# Patient Record
Sex: FEMALE | Race: WHITE | Employment: OTHER | URBAN - METROPOLITAN AREA
[De-identification: names, ages, dates, MRNs, and addresses within clinical notes are randomized per-mention and may not be internally consistent; named-entity substitution may affect disease eponyms.]

---

## 2017-12-29 PROBLEM — M54.16 LUMBAR RADICULITIS: Status: ACTIVE | Noted: 2017-12-29

## 2017-12-29 PROBLEM — M51.36 DDD (DEGENERATIVE DISC DISEASE), LUMBAR: Status: ACTIVE | Noted: 2017-12-29

## 2017-12-29 PROBLEM — M51.369 DDD (DEGENERATIVE DISC DISEASE), LUMBAR: Status: ACTIVE | Noted: 2017-12-29

## 2017-12-29 PROBLEM — M47.816 LUMBAR SPONDYLOSIS: Status: ACTIVE | Noted: 2017-12-29

## 2017-12-29 PROBLEM — M48.062 SPINAL STENOSIS OF LUMBAR REGION WITH NEUROGENIC CLAUDICATION: Status: ACTIVE | Noted: 2017-12-29

## 2024-03-12 ENCOUNTER — OFFICE VISIT (OUTPATIENT)
Dept: OTOLARYNGOLOGY | Facility: CLINIC | Age: 84
End: 2024-03-12

## 2024-03-12 VITALS — BODY MASS INDEX: 21.83 KG/M2 | WEIGHT: 131 LBS | HEIGHT: 65 IN

## 2024-03-12 DIAGNOSIS — H93.13 BILATERAL TINNITUS: Primary | ICD-10-CM

## 2024-03-12 DIAGNOSIS — H61.23 BILATERAL IMPACTED CERUMEN: ICD-10-CM

## 2024-03-12 DIAGNOSIS — H74.01 TYMPANOSCLEROSIS OF RIGHT EAR: ICD-10-CM

## 2024-03-12 PROCEDURE — 3008F BODY MASS INDEX DOCD: CPT | Performed by: SPECIALIST

## 2024-03-12 PROCEDURE — 99202 OFFICE O/P NEW SF 15 MIN: CPT | Performed by: SPECIALIST

## 2024-03-12 PROCEDURE — 1159F MED LIST DOCD IN RCRD: CPT | Performed by: SPECIALIST

## 2024-03-12 PROCEDURE — 69210 REMOVE IMPACTED EAR WAX UNI: CPT | Performed by: SPECIALIST

## 2024-03-12 PROCEDURE — 1160F RVW MEDS BY RX/DR IN RCRD: CPT | Performed by: SPECIALIST

## 2024-03-12 RX ORDER — LOSARTAN POTASSIUM 50 MG/1
1 TABLET ORAL DAILY
COMMUNITY

## 2024-03-12 RX ORDER — METOPROLOL SUCCINATE 25 MG/1
1 TABLET, EXTENDED RELEASE ORAL DAILY
COMMUNITY

## 2024-03-12 NOTE — PROGRESS NOTES
Wilma Evans is a 84 year old female.   Chief Complaint   Patient presents with    Ear Wax     Ear cleaning    Ringing In Ear     Tinnitus in ears     HPI:   Patient here for bilateral tinnitus and cerumen occlusions.  Wears binaural hearing aids.    Current Outpatient Medications   Medication Sig Dispense Refill    losartan 50 MG Oral Tab Take 1 tablet (50 mg total) by mouth daily.      metoprolol succinate ER 25 MG Oral Tablet 24 Hr Take 1 tablet (25 mg total) by mouth daily.      DIOVAN -25 MG OR TABS 1 TABLET DAILY      LIPITOR 20 MG OR TABS 1 TABLET DAILY        Past Medical History:   Diagnosis Date    CANCER     HYPERTENSION     Unspecified essential hypertension       Social History:  Social History     Socioeconomic History    Marital status:    Tobacco Use    Smoking status: Former    Smokeless tobacco: Never    Tobacco comments:     quit 20 years ago    Vaping Use    Vaping Use: Never used   Substance and Sexual Activity    Alcohol use: Yes     Comment: glass of wine with dinner     Drug use: No        REVIEW OF SYSTEMS:   GENERAL HEALTH: feels well otherwise  GENERAL : denies fever, chills, sweats, weight loss, weight gain  SKIN: denies any unusual skin lesions or rashes  RESPIRATORY: denies shortness of breath with exertion  NEURO: denies headaches    EXAM:   Ht 5' 5\" (1.651 m)   Wt 131 lb (59.4 kg)   BMI 21.80 kg/m²   System Details   Skin Inspection - Normal.   Constitutional Overall appearance - Normal.   Head/Face Facial features - Normal. Eyebrows - Normal. Skull - Normal.   Eyes Conjunctiva - Right: Normal, Left: Normal. Pupil - Right: Normal, Left: Normal.    Ears Inspection - Right: Normal, Left: Normal.   Ears = bilateral cerumen occlussions.    Fully cleaned under microscope using instrumentation and suctioning.    Tympanic sclerosis right greater than left.  Tympanic membranes are intact.       Nasal External nose - Normal.      Oral/Oropharynx Lips - Normal,    Neck Exam  Inspection - Normal. Palpation - Normal. Parotid gland - Normal. Thyroid gland - Normal.   Lymph Detail Submental. Submandibular. Anterior cervical. Posterior cervical. Supraclavicular all without enlargement   Psychiatric Orientation - Oriented to time, place, person & situation. Appropriate mood and affect.   Neurological Memory - Normal. Cranial nerves - Cranial nerves II through XII grossly intact.     ASSESSMENT AND PLAN:   1. Bilateral tinnitus  Patient to get an audiogram to better evaluate.  - Audiology Referral - [facility])    2. Tympanosclerosis of right ear  tympannosclerosis right greater than left    3. Bilateral impacted cerumen  Fully cleaned.  Follow-up in 2 to 3 months time, sooner if problems.  - Removal Impacted Cerumen Requiring Instrumentation, Bilateral      The patient indicates understanding of these issues and agrees to the plan.      Jil Tinajero MD  3/12/2024  8:33 AM

## 2024-03-12 NOTE — PATIENT INSTRUCTIONS
When was fully cleaned from both your ears.  An audiogram was ordered to better evaluate your underlying hearing and tinnitus.  You can reduce your sodium and caffeine, you can also try Lipoflavonoid.  Follow-up in 2 to 3 months time, sooner if problems.

## 2024-05-14 ENCOUNTER — OFFICE VISIT (OUTPATIENT)
Dept: OTOLARYNGOLOGY | Facility: CLINIC | Age: 84
End: 2024-05-14

## 2024-05-14 VITALS — WEIGHT: 132 LBS | BODY MASS INDEX: 21.99 KG/M2 | HEIGHT: 65 IN

## 2024-05-14 DIAGNOSIS — H61.23 BILATERAL IMPACTED CERUMEN: Primary | ICD-10-CM

## 2024-05-14 PROCEDURE — 3008F BODY MASS INDEX DOCD: CPT | Performed by: SPECIALIST

## 2024-05-14 PROCEDURE — 1160F RVW MEDS BY RX/DR IN RCRD: CPT | Performed by: SPECIALIST

## 2024-05-14 PROCEDURE — 69210 REMOVE IMPACTED EAR WAX UNI: CPT | Performed by: SPECIALIST

## 2024-05-14 PROCEDURE — 1159F MED LIST DOCD IN RCRD: CPT | Performed by: SPECIALIST

## 2024-05-14 NOTE — PROGRESS NOTES
Ears = bilateral cerumen occlussions.    Fully cleaned under microscope using instrumentation and suctioning.    Normal tympanic membranes.  Follow-up in 2 months time, sooner if problems.

## 2024-05-14 NOTE — PATIENT INSTRUCTIONS
Thank you cerumen was fully cleaned from both your ears.  Follow-up in 2 months time, sooner if problems.

## 2024-07-09 ENCOUNTER — OFFICE VISIT (OUTPATIENT)
Dept: OTOLARYNGOLOGY | Facility: CLINIC | Age: 84
End: 2024-07-09

## 2024-07-09 VITALS — WEIGHT: 134 LBS | BODY MASS INDEX: 22.33 KG/M2 | HEIGHT: 65 IN

## 2024-07-09 DIAGNOSIS — H61.23 BILATERAL IMPACTED CERUMEN: Primary | ICD-10-CM

## 2024-07-09 PROCEDURE — 1159F MED LIST DOCD IN RCRD: CPT | Performed by: SPECIALIST

## 2024-07-09 PROCEDURE — 1160F RVW MEDS BY RX/DR IN RCRD: CPT | Performed by: SPECIALIST

## 2024-07-09 PROCEDURE — 69210 REMOVE IMPACTED EAR WAX UNI: CPT | Performed by: SPECIALIST

## 2024-07-09 PROCEDURE — 3008F BODY MASS INDEX DOCD: CPT | Performed by: SPECIALIST

## 2024-07-09 NOTE — PATIENT INSTRUCTIONS
Cerumen was fully cleaned from both your ears.  Follow-up in 2 months time, sooner if problems.

## 2024-09-10 ENCOUNTER — OFFICE VISIT (OUTPATIENT)
Dept: OTOLARYNGOLOGY | Facility: CLINIC | Age: 84
End: 2024-09-10

## 2024-09-10 VITALS — HEIGHT: 65 IN | BODY MASS INDEX: 22.33 KG/M2 | WEIGHT: 134 LBS

## 2024-09-10 DIAGNOSIS — H61.23 BILATERAL IMPACTED CERUMEN: Primary | ICD-10-CM

## 2024-09-10 PROCEDURE — 3008F BODY MASS INDEX DOCD: CPT | Performed by: SPECIALIST

## 2024-09-10 PROCEDURE — 1160F RVW MEDS BY RX/DR IN RCRD: CPT | Performed by: SPECIALIST

## 2024-09-10 PROCEDURE — 1159F MED LIST DOCD IN RCRD: CPT | Performed by: SPECIALIST

## 2024-11-12 ENCOUNTER — OFFICE VISIT (OUTPATIENT)
Dept: OTOLARYNGOLOGY | Facility: CLINIC | Age: 84
End: 2024-11-12

## 2024-11-12 VITALS — HEIGHT: 65 IN | WEIGHT: 134 LBS | BODY MASS INDEX: 22.33 KG/M2

## 2024-11-12 DIAGNOSIS — H61.23 BILATERAL IMPACTED CERUMEN: Primary | ICD-10-CM

## 2024-11-12 PROCEDURE — 3008F BODY MASS INDEX DOCD: CPT | Performed by: SPECIALIST

## 2024-11-12 PROCEDURE — 69210 REMOVE IMPACTED EAR WAX UNI: CPT | Performed by: SPECIALIST

## 2024-11-12 PROCEDURE — 1159F MED LIST DOCD IN RCRD: CPT | Performed by: SPECIALIST

## 2024-11-12 PROCEDURE — 1160F RVW MEDS BY RX/DR IN RCRD: CPT | Performed by: SPECIALIST

## 2024-11-12 NOTE — PATIENT INSTRUCTIONS
Cerumen was fully cleaned from both your ears.  Follow-up in 3 months time, sooner if problems.

## 2024-11-12 NOTE — PROGRESS NOTES
Ears = bilateral cerumen occlussions.    Fully cleaned under microscope using instrumentation and suctioning.    Normal tympanic membranes.  Follow-up in 3 months time, sooner if problems.

## 2025-02-11 ENCOUNTER — OFFICE VISIT (OUTPATIENT)
Dept: OTOLARYNGOLOGY | Facility: CLINIC | Age: 85
End: 2025-02-11

## 2025-02-11 VITALS — BODY MASS INDEX: 22.33 KG/M2 | HEIGHT: 65 IN | WEIGHT: 134 LBS

## 2025-02-11 DIAGNOSIS — H61.23 BILATERAL IMPACTED CERUMEN: Primary | ICD-10-CM

## 2025-02-11 PROCEDURE — 1159F MED LIST DOCD IN RCRD: CPT | Performed by: SPECIALIST

## 2025-02-11 PROCEDURE — 69210 REMOVE IMPACTED EAR WAX UNI: CPT | Performed by: SPECIALIST

## 2025-02-11 PROCEDURE — 1160F RVW MEDS BY RX/DR IN RCRD: CPT | Performed by: SPECIALIST

## 2025-02-11 PROCEDURE — 3008F BODY MASS INDEX DOCD: CPT | Performed by: SPECIALIST

## 2025-02-11 RX ORDER — TRAZODONE HYDROCHLORIDE 50 MG/1
TABLET ORAL
COMMUNITY
Start: 2025-01-03

## 2025-02-11 RX ORDER — BETAMETHASONE DIPROPIONATE 0.5 MG/G
CREAM TOPICAL
COMMUNITY
Start: 2025-01-03

## 2025-02-11 RX ORDER — FAMOTIDINE 20 MG/1
TABLET, FILM COATED ORAL
COMMUNITY
Start: 2025-01-03

## 2025-05-13 ENCOUNTER — OFFICE VISIT (OUTPATIENT)
Dept: OTOLARYNGOLOGY | Facility: CLINIC | Age: 85
End: 2025-05-13

## 2025-05-13 DIAGNOSIS — H61.23 BILATERAL IMPACTED CERUMEN: ICD-10-CM

## 2025-05-13 DIAGNOSIS — R13.12 OROPHARYNGEAL DYSPHAGIA: Primary | ICD-10-CM

## 2025-05-13 PROCEDURE — 99213 OFFICE O/P EST LOW 20 MIN: CPT | Performed by: SPECIALIST

## 2025-05-13 PROCEDURE — 1160F RVW MEDS BY RX/DR IN RCRD: CPT | Performed by: SPECIALIST

## 2025-05-13 PROCEDURE — 1159F MED LIST DOCD IN RCRD: CPT | Performed by: SPECIALIST

## 2025-05-13 PROCEDURE — 69210 REMOVE IMPACTED EAR WAX UNI: CPT | Performed by: SPECIALIST

## 2025-05-13 NOTE — PATIENT INSTRUCTIONS
Cerumen was fully cleaned from both your ears right greater than left.  I did order a video swallow to evaluate your difficulty swallowing solids more than liquids.  Will of course notify you of the results.  Follow-up in 3 months time, sooner if problems.

## 2025-05-13 NOTE — PROGRESS NOTES
Wilma Evans is a 85 year old female.   Chief Complaint   Patient presents with    Ear Problem     Patient here for 3 month ear cleaning      HPI:   Here to get her ears cleaned and checked.  Also has been having swallowing problems for solids.    Current Medications[1]   Past Medical History[2]   Social History:  Short Social Hx on File[3]     REVIEW OF SYSTEMS:   GENERAL HEALTH: feels well otherwise  GENERAL : denies fever, chills, sweats, weight loss, weight gain  SKIN: denies any unusual skin lesions or rashes  RESPIRATORY: denies shortness of breath with exertion  NEURO: denies headaches    EXAM:   There were no vitals taken for this visit.  System Details   Skin Inspection - Normal.   Constitutional Overall appearance - Normal.   Head/Face Facial features - Normal. Eyebrows - Normal. Skull - Normal.   Eyes Conjunctiva - Right: Normal, Left: Normal. Pupil - Right: Normal, Left: Normal.    Ears Inspection - Right: Normal, Left: Normal.   Ears = bilateral cerumen occlussions.    Fully cleaned under microscope using instrumentation and suctioning.    Normal tympanic membranes.     Nasal External nose - Normal.   Nasal septum - Normal.  Turbinates - Normal   Oral/Oropharynx Lips - Normal, Tonsils - Normal, Tongue - Normal    Neck Exam Inspection - Normal. Palpation - Normal. Parotid gland - Normal. Thyroid gland - Normal.   Lymph Detail Submental. Submandibular. Anterior cervical. Posterior cervical. Supraclavicular.  All without enlargement   Psychiatric Orientation - Oriented to time, place, person & situation. Appropriate mood and affect.   Neurological Memory - Normal. Cranial nerves - Cranial nerves II through XII grossly intact.   Larynx Mirror examination with normal vocal cord mobility.  No tumors or masses seen.  No retained secretions.     ASSESSMENT AND PLAN:   1. Oropharyngeal dysphagia  Will get a video swallow.  I will of course call the patient with the results.  - XR VIDEO SWALLOW (CPT=74230);  Future    2. Bilateral impacted cerumen  Fully cleaned.  Follow-up in 3 months time, sooner if problems.      The patient indicates understanding of these issues and agrees to the plan.      Jil Tinajero MD  5/13/2025  3:41 PM       [1]   Current Outpatient Medications   Medication Sig Dispense Refill    famotidine 20 MG Oral Tab       traZODone 50 MG Oral Tab       betamethasone dipropionate 0.05 % External Cream APPLY TOPICALLY 1 APPLICATION TO AFFECTED AREA DAILY      losartan 50 MG Oral Tab Take 1 tablet (50 mg total) by mouth daily.      metoprolol succinate ER 25 MG Oral Tablet 24 Hr Take 1 tablet (25 mg total) by mouth daily.      DIOVAN -25 MG OR TABS 1 TABLET DAILY      LIPITOR 20 MG OR TABS 1 TABLET DAILY     [2]   Past Medical History:   CANCER    HYPERTENSION    Unspecified essential hypertension   [3]   Social History  Socioeconomic History    Marital status:    Tobacco Use    Smoking status: Former    Smokeless tobacco: Never    Tobacco comments:     quit 20 years ago    Vaping Use    Vaping status: Never Used   Substance and Sexual Activity    Alcohol use: Yes     Comment: glass of wine with dinner     Drug use: No

## 2025-06-11 ENCOUNTER — PATIENT MESSAGE (OUTPATIENT)
Dept: OTOLARYNGOLOGY | Facility: CLINIC | Age: 85
End: 2025-06-11

## 2025-06-11 ENCOUNTER — HOSPITAL ENCOUNTER (OUTPATIENT)
Dept: GENERAL RADIOLOGY | Facility: HOSPITAL | Age: 85
Discharge: HOME | End: 2025-06-11
Attending: SPECIALIST
Payer: MEDICARE

## 2025-06-11 DIAGNOSIS — R13.12 OROPHARYNGEAL DYSPHAGIA: Primary | ICD-10-CM

## 2025-06-11 DIAGNOSIS — R13.12 OROPHARYNGEAL DYSPHAGIA: ICD-10-CM

## 2025-06-11 PROCEDURE — 74230 X-RAY XM SWLNG FUNCJ C+: CPT | Performed by: SPECIALIST

## 2025-06-11 PROCEDURE — 92611 MOTION FLUOROSCOPY/SWALLOW: CPT

## 2025-06-11 NOTE — PATIENT INSTRUCTIONS
VIDEO SWALLOW STUDY    Diet Recommendations:  Solids: Regular, IDDSI 7  Liquids: Thin, IDDSI 0     Recommended compensatory strategies:   Sit upright  Single drinks  Eat slowly  Alternate liquids and solids  No straw     Medication Administration:  No restrictions     Further Follow-up:  Swallowing therapy is recommended.  Please call number below to schedule.    Rosanne Shanks MA/CCC-SLP  Speech Language Pathologist  [facility]  [phone]

## 2025-06-11 NOTE — PROGRESS NOTES
ADULT VIDEOFLUOROSCOPIC SWALLOWING STUDY       ADULT VIDEOFLUOROSCOPIC SWALLOWING STUDY:   Referring Physician: Tanvi      Radiologist:   Diagnosis: dysphagia    Date of Service: 6/11/2025     PATIENT SUMMARY   Chief Complaint: When eating, patient feels like her airway shuts off and she gasps briefly for air.  She doesn't feel like she is choking, but she gets a occasional tickle and then she regurgitates her food.  She has intermittent shortness of breath on exertion.  She denies odynophagia and weight loss.    Current Diet: regular foods and liquids       Problem List  Active Problems:  Active Problems:    * No active hospital problems. *      Past Medical History  Past Medical History[1]     Imaging results: no recent imaging    ASSESSMENT   DYSPHAGIA ASSESSMENT  Test completed in conjunction with Radiologist.   Food/Liquid Types Presented: puree, solid, and thin liquids.    Study Position and View:  Patient was seated upright and viewed laterally and A-P.    Pain Assessment: The patient reports pain at a level of 0/10.    Oral phase:  Oral phase was within normal limits with adequate bilabial seal and bolus containment, timely mastication and transit and no oral retention.    Pharyngeal phase:  The pharyngeal response triggered at the tongue base for all consistencies.  Slightly delayed epiglottic inversion resulted in intermittent trace laryngeal penetration during the swallow with thin liquids by cup and straw.  Greater depth and amount occurred with straw versus cup drinking, with one trace episode contacting the vocal cords without reflexive response.  Most presentations via cup were spontaneously ejected from the laryngeal vestibule with the completion of the swallow.  One episode of penetration with cup occurred after the swallow on mild retention which remained.  This appeared to be due to slow esophageal motility with back up of contents in the esophagus almost to the UES on that occasion.    The patient cleared her throat in response to the penetration which ejected the material from the larynx.  No aspiration was observed.  Base of tongue retraction and hyolaryngeal excursion were adequate.  Minimal diffuse retention remained with puree and solids and with one presentation of thin liquids.    Esophageal phase: Intermittent slow motility with retention remaining at the aortic arch.      Penetration Aspiration Scale: 5/8.  Material entered the airway, contacted the vocal cords, and was not ejected from the airway.      Overall Impression: Mild pharyngeal and esophageal dysphagia characterized by trace intermittent laryngeal penetration with and without ejection from the airway.  No definite aspiration was observed.  One episode of penetration by straw contacted the vocal cords without reflexive response.  One larger episode of penetration with thin liquids by cup occurred after the swallow on retention which remained.  This appeared to be due to back flow or slow motility in the esophagus.  Mild esophageal retention remained at the aortic arch.  Recommend general diet with thin liquids using strategies outlined below.  Short term dysphagia therapy is recommended.      FCM category and level: Swallowing, 5  PLAN   Potential: Good    Diet Recommendations:  Solids: Regular, IDDSI 7  Liquids: Thin, IDDSI 0    Recommended compensatory strategies:   Sit upright  Single drinks  Eat slowly  Alternate liquids and solids  No straw    Medication Administration:  No restrictions    Further Follow-up:  Dysphagia therapy is recommended.    GOALS (to be met 4 visits)  The patient will tolerate general diet consistency and thin liquids without overt signs or symptoms of aspiration with 100 % accuracy  The patient/family/caregiver will demonstrate understanding and implementation of aspiration precautions and swallow strategies independently  Sit upright  Single drinks  Eat slowly  Alternate liquids and solids  No straw    Patient will reduce risk of aspiration by completing dysphagia exercises to 90% accuracy     EDUCATION/INSTRUCTION  Reviewed results and recommendations with patient.  Written instructions were provided.  Agreement/Understanding verbalized and all questions answered to their apparent satisfaction.      INTERDISCIPLINARY COMMUNICATION  Reviewed results with Radiologist; agreement verbalized.        Patient was advised of these findings, precautions, recommendations and treatment options and has agreed to actively participate in planning and for this course of care.    Thank you for your referral. Please co-sign or sign and return this letter via fax as soon as possible. If you have any questions, please contact me at [phone].    Rosanne Shanks MA/YAMILETH-SLP  Speech Language Pathologist  [facility]  [phone]    Electronically signed by therapist: Rosanne Shanks, SLP  Physician's certification required: Yes  I certify the need for these services furnished under this plan of treatment and while under my care.    X___________________________________________________ Date____________________    Certification From: 6/11/2025  To:9/9/2025           [1]   Past Medical History:   CANCER    HYPERTENSION    Unspecified essential hypertension

## 2025-08-12 ENCOUNTER — OFFICE VISIT (OUTPATIENT)
Dept: OTOLARYNGOLOGY | Facility: CLINIC | Age: 85
End: 2025-08-12

## 2025-08-12 VITALS — BODY MASS INDEX: 22.33 KG/M2 | WEIGHT: 134 LBS | HEIGHT: 65 IN

## 2025-08-12 DIAGNOSIS — H61.23 BILATERAL IMPACTED CERUMEN: ICD-10-CM

## 2025-08-12 DIAGNOSIS — E04.1 THYROID NODULE: ICD-10-CM

## 2025-08-12 DIAGNOSIS — R13.12 OROPHARYNGEAL DYSPHAGIA: Primary | ICD-10-CM

## 2025-08-12 PROCEDURE — 1160F RVW MEDS BY RX/DR IN RCRD: CPT | Performed by: SPECIALIST

## 2025-08-12 PROCEDURE — 69210 REMOVE IMPACTED EAR WAX UNI: CPT | Performed by: SPECIALIST

## 2025-08-12 PROCEDURE — 3008F BODY MASS INDEX DOCD: CPT | Performed by: SPECIALIST

## 2025-08-12 PROCEDURE — 1159F MED LIST DOCD IN RCRD: CPT | Performed by: SPECIALIST

## 2025-08-12 PROCEDURE — 99213 OFFICE O/P EST LOW 20 MIN: CPT | Performed by: SPECIALIST
